# Patient Record
Sex: MALE | Race: WHITE | NOT HISPANIC OR LATINO | URBAN - METROPOLITAN AREA
[De-identification: names, ages, dates, MRNs, and addresses within clinical notes are randomized per-mention and may not be internally consistent; named-entity substitution may affect disease eponyms.]

---

## 2017-02-26 ENCOUNTER — EMERGENCY (EMERGENCY)
Facility: HOSPITAL | Age: 47
LOS: 1 days | Discharge: HOME | End: 2017-02-26
Attending: EMERGENCY MEDICINE | Admitting: EMERGENCY MEDICINE
Payer: COMMERCIAL

## 2017-02-26 VITALS
DIASTOLIC BLOOD PRESSURE: 100 MMHG | WEIGHT: 255.07 LBS | TEMPERATURE: 98 F | RESPIRATION RATE: 20 BRPM | OXYGEN SATURATION: 99 % | SYSTOLIC BLOOD PRESSURE: 175 MMHG | HEART RATE: 79 BPM

## 2017-02-26 DIAGNOSIS — R51 HEADACHE: ICD-10-CM

## 2017-02-26 DIAGNOSIS — I10 ESSENTIAL (PRIMARY) HYPERTENSION: ICD-10-CM

## 2017-02-26 DIAGNOSIS — E78.5 HYPERLIPIDEMIA, UNSPECIFIED: ICD-10-CM

## 2017-02-26 PROCEDURE — 70450 CT HEAD/BRAIN W/O DYE: CPT

## 2017-02-26 PROCEDURE — 93005 ELECTROCARDIOGRAM TRACING: CPT

## 2017-02-26 PROCEDURE — 70450 CT HEAD/BRAIN W/O DYE: CPT | Mod: 26

## 2017-02-26 PROCEDURE — 99284 EMERGENCY DEPT VISIT MOD MDM: CPT | Mod: 25

## 2017-02-26 PROCEDURE — 93010 ELECTROCARDIOGRAM REPORT: CPT

## 2017-02-26 PROCEDURE — 99284 EMERGENCY DEPT VISIT MOD MDM: CPT

## 2017-02-26 RX ORDER — SODIUM CHLORIDE 9 MG/ML
3 INJECTION INTRAMUSCULAR; INTRAVENOUS; SUBCUTANEOUS ONCE
Qty: 0 | Refills: 0 | Status: DISCONTINUED | OUTPATIENT
Start: 2017-02-26 | End: 2017-03-02

## 2017-02-26 NOTE — ED STATDOCS - PROGRESS NOTE DETAILS
47 y/o male HTN (Metroprolol) and HLD (Crestor, Losartan) presents to ED, with wife at bedside, c/o intermittent worsening HA in the temples and the parietal region with associated dizziness onset 3 days. Pt also feels tired and is not sure if that is the reason for the DURBIN. Pt has been having HA for 3 days and takes Tylenol every time he has a HA, though there is no relief. Today pt was playing soccer with his daughter; when he came inside, he had a sharp, throbbing HA. He took Tylenol, and there was slight relief today. No prior episodes of HA, as per pt. Denies fever, weakness, numbness. Pt drinks 5-6 days a week, no smoking. On pantoprazole, taken as needed. No further complaints at this time.  PE: normal. Manual bp 160/108 with intermittent HA and feeling "and just not feeling right". Pt with uncontrolled HTN and severe HA to go to main ED for further workup and treatment.

## 2017-02-26 NOTE — ED ADULT TRIAGE NOTE - CHIEF COMPLAINT QUOTE
headache Friday. nausea with poor appetite. pt states no chest pain and no dyspnea, not photophobic. dizzy, weak.

## 2017-03-01 NOTE — ED PROVIDER NOTE - PHYSICAL EXAMINATION
neuro: CN II - XII intact, EOMI, PERRL, no papilledema, 5/5 muscle strength x 4 extremities, no sensory deficits, 2+ dtr globally, negative babinski, no ataxic gait, normal JOEY and FNT, normal romberg

## 2017-03-01 NOTE — ED PROVIDER NOTE - OBJECTIVE STATEMENT
47 y/o male HTN (Metroprolol) and HLD (Crestor, Losartan) presents to ED, with wife at bedside, c/o intermittent worsening HA in the temples and the parietal region with associated dizziness onset 3 days. Pt also feels tired and is not sure if that is the reason for the DURBIN. Pt has been having HA for 3 days and takes Tylenol every time he has a HA, though there is no relief. Today pt was playing soccer with his daughter; when he came inside, he had a sharp, throbbing HA. He took Tylenol, and there was slight relief today. No prior episodes of HA, as per pt. Denies fever, weakness, numbness. Pt drinks 5-6 days a week, no smoking. On pantoprazole, taken as needed. No further complaints at this time.

## 2017-03-01 NOTE — ED PROVIDER NOTE - MEDICAL DECISION MAKING DETAILS
this was exertional HA while having sex we talked about risk benefit of spinal tap . he presents within 6 hours of HA we discussed senssitivity and specifitcity of neg ct he agrees to accept this small risk and will return if worse or development of any motor or sensory deficits.

## 2017-11-01 ENCOUNTER — RESULT REVIEW (OUTPATIENT)
Age: 47
End: 2017-11-01

## 2025-05-13 NOTE — ED PROVIDER NOTE - PRINCIPAL DIAGNOSIS
Population Health Review...  Per [facility] website, insurance is active and pt is listed on the attributed list needing a healthy you performed in 2025  HY scheduled for 5/28/2025   Headache